# Patient Record
Sex: MALE | Race: WHITE | HISPANIC OR LATINO | Employment: FULL TIME | URBAN - METROPOLITAN AREA
[De-identification: names, ages, dates, MRNs, and addresses within clinical notes are randomized per-mention and may not be internally consistent; named-entity substitution may affect disease eponyms.]

---

## 2024-01-10 ENCOUNTER — HOSPITAL ENCOUNTER (EMERGENCY)
Facility: HOSPITAL | Age: 24
Discharge: HOME | End: 2024-01-10
Attending: INTERNAL MEDICINE

## 2024-01-10 ENCOUNTER — APPOINTMENT (OUTPATIENT)
Dept: CARDIOLOGY | Facility: HOSPITAL | Age: 24
End: 2024-01-10

## 2024-01-10 ENCOUNTER — APPOINTMENT (OUTPATIENT)
Dept: RADIOLOGY | Facility: HOSPITAL | Age: 24
End: 2024-01-10

## 2024-01-10 VITALS
WEIGHT: 180 LBS | TEMPERATURE: 98.2 F | HEART RATE: 74 BPM | BODY MASS INDEX: 28.93 KG/M2 | SYSTOLIC BLOOD PRESSURE: 135 MMHG | DIASTOLIC BLOOD PRESSURE: 79 MMHG | OXYGEN SATURATION: 97 % | HEIGHT: 66 IN | RESPIRATION RATE: 16 BRPM

## 2024-01-10 DIAGNOSIS — R55 SYNCOPE, UNSPECIFIED SYNCOPE TYPE: Primary | ICD-10-CM

## 2024-01-10 LAB
ALBUMIN SERPL BCP-MCNC: 4.6 G/DL (ref 3.4–5)
ALP SERPL-CCNC: 77 U/L (ref 33–120)
ALT SERPL W P-5'-P-CCNC: 29 U/L (ref 10–52)
ANION GAP SERPL CALC-SCNC: 13 MMOL/L (ref 10–20)
APPEARANCE UR: ABNORMAL
APTT PPP: 34 SECONDS (ref 27–38)
AST SERPL W P-5'-P-CCNC: 23 U/L (ref 9–39)
BASOPHILS # BLD AUTO: 0.02 X10*3/UL (ref 0–0.1)
BASOPHILS NFR BLD AUTO: 0.2 %
BILIRUB SERPL-MCNC: 0.4 MG/DL (ref 0–1.2)
BILIRUB UR STRIP.AUTO-MCNC: NEGATIVE MG/DL
BUN SERPL-MCNC: 13 MG/DL (ref 6–23)
CALCIUM SERPL-MCNC: 9.9 MG/DL (ref 8.6–10.3)
CARDIAC TROPONIN I PNL SERPL HS: <3 NG/L (ref 0–20)
CHLORIDE SERPL-SCNC: 102 MMOL/L (ref 98–107)
CO2 SERPL-SCNC: 25 MMOL/L (ref 21–32)
COLOR UR: YELLOW
CREAT SERPL-MCNC: 0.96 MG/DL (ref 0.5–1.3)
D DIMER PPP FEU-MCNC: 317 NG/ML FEU
EGFRCR SERPLBLD CKD-EPI 2021: >90 ML/MIN/1.73M*2
EOSINOPHIL # BLD AUTO: 0.02 X10*3/UL (ref 0–0.7)
EOSINOPHIL NFR BLD AUTO: 0.2 %
ERYTHROCYTE [DISTWIDTH] IN BLOOD BY AUTOMATED COUNT: 12.3 % (ref 11.5–14.5)
GLUCOSE BLD MANUAL STRIP-MCNC: 100 MG/DL (ref 74–99)
GLUCOSE SERPL-MCNC: 94 MG/DL (ref 74–99)
GLUCOSE UR STRIP.AUTO-MCNC: NEGATIVE MG/DL
HCT VFR BLD AUTO: 48.5 % (ref 41–52)
HGB BLD-MCNC: 15.7 G/DL (ref 13.5–17.5)
IMM GRANULOCYTES # BLD AUTO: 0.05 X10*3/UL (ref 0–0.7)
IMM GRANULOCYTES NFR BLD AUTO: 0.5 % (ref 0–0.9)
INR PPP: 1.3 (ref 0.9–1.1)
KETONES UR STRIP.AUTO-MCNC: NEGATIVE MG/DL
LEUKOCYTE ESTERASE UR QL STRIP.AUTO: NEGATIVE
LYMPHOCYTES # BLD AUTO: 1.1 X10*3/UL (ref 1.2–4.8)
LYMPHOCYTES NFR BLD AUTO: 11 %
MCH RBC QN AUTO: 29.9 PG (ref 26–34)
MCHC RBC AUTO-ENTMCNC: 32.4 G/DL (ref 32–36)
MCV RBC AUTO: 92 FL (ref 80–100)
MONOCYTES # BLD AUTO: 0.6 X10*3/UL (ref 0.1–1)
MONOCYTES NFR BLD AUTO: 6 %
NEUTROPHILS # BLD AUTO: 8.25 X10*3/UL (ref 1.2–7.7)
NEUTROPHILS NFR BLD AUTO: 82.1 %
NITRITE UR QL STRIP.AUTO: NEGATIVE
NRBC BLD-RTO: 0 /100 WBCS (ref 0–0)
PH UR STRIP.AUTO: 8 [PH]
PLATELET # BLD AUTO: 246 X10*3/UL (ref 150–450)
POTASSIUM SERPL-SCNC: 3.9 MMOL/L (ref 3.5–5.3)
PROT SERPL-MCNC: 7.8 G/DL (ref 6.4–8.2)
PROT UR STRIP.AUTO-MCNC: NEGATIVE MG/DL
PROTHROMBIN TIME: 14.1 SECONDS (ref 9.8–12.8)
RBC # BLD AUTO: 5.25 X10*6/UL (ref 4.5–5.9)
RBC # UR STRIP.AUTO: NEGATIVE /UL
SODIUM SERPL-SCNC: 136 MMOL/L (ref 136–145)
SP GR UR STRIP.AUTO: 1.01
UROBILINOGEN UR STRIP.AUTO-MCNC: <2 MG/DL
WBC # BLD AUTO: 10 X10*3/UL (ref 4.4–11.3)

## 2024-01-10 PROCEDURE — 87800 DETECT AGNT MULT DNA DIREC: CPT | Mod: PARLAB | Performed by: PHYSICIAN ASSISTANT

## 2024-01-10 PROCEDURE — 71045 X-RAY EXAM CHEST 1 VIEW: CPT

## 2024-01-10 PROCEDURE — 93005 ELECTROCARDIOGRAM TRACING: CPT

## 2024-01-10 PROCEDURE — 85730 THROMBOPLASTIN TIME PARTIAL: CPT | Performed by: PHYSICIAN ASSISTANT

## 2024-01-10 PROCEDURE — 82947 ASSAY GLUCOSE BLOOD QUANT: CPT

## 2024-01-10 PROCEDURE — 84484 ASSAY OF TROPONIN QUANT: CPT | Performed by: PHYSICIAN ASSISTANT

## 2024-01-10 PROCEDURE — 71045 X-RAY EXAM CHEST 1 VIEW: CPT | Performed by: RADIOLOGY

## 2024-01-10 PROCEDURE — 85610 PROTHROMBIN TIME: CPT | Performed by: PHYSICIAN ASSISTANT

## 2024-01-10 PROCEDURE — 85025 COMPLETE CBC W/AUTO DIFF WBC: CPT | Performed by: PHYSICIAN ASSISTANT

## 2024-01-10 PROCEDURE — 99283 EMERGENCY DEPT VISIT LOW MDM: CPT

## 2024-01-10 PROCEDURE — 84075 ASSAY ALKALINE PHOSPHATASE: CPT | Performed by: PHYSICIAN ASSISTANT

## 2024-01-10 PROCEDURE — 99284 EMERGENCY DEPT VISIT MOD MDM: CPT | Performed by: INTERNAL MEDICINE

## 2024-01-10 PROCEDURE — 85379 FIBRIN DEGRADATION QUANT: CPT | Performed by: PHYSICIAN ASSISTANT

## 2024-01-10 PROCEDURE — 81003 URINALYSIS AUTO W/O SCOPE: CPT | Performed by: PHYSICIAN ASSISTANT

## 2024-01-10 PROCEDURE — 36415 COLL VENOUS BLD VENIPUNCTURE: CPT | Performed by: PHYSICIAN ASSISTANT

## 2024-01-10 ASSESSMENT — COLUMBIA-SUICIDE SEVERITY RATING SCALE - C-SSRS
2. HAVE YOU ACTUALLY HAD ANY THOUGHTS OF KILLING YOURSELF?: NO
6. HAVE YOU EVER DONE ANYTHING, STARTED TO DO ANYTHING, OR PREPARED TO DO ANYTHING TO END YOUR LIFE?: NO
1. IN THE PAST MONTH, HAVE YOU WISHED YOU WERE DEAD OR WISHED YOU COULD GO TO SLEEP AND NOT WAKE UP?: NO

## 2024-01-10 ASSESSMENT — LIFESTYLE VARIABLES
REASON UNABLE TO ASSESS: YES
EVER FELT BAD OR GUILTY ABOUT YOUR DRINKING: NO
HAVE YOU EVER FELT YOU SHOULD CUT DOWN ON YOUR DRINKING: NO
HAVE PEOPLE ANNOYED YOU BY CRITICIZING YOUR DRINKING: NO
EVER HAD A DRINK FIRST THING IN THE MORNING TO STEADY YOUR NERVES TO GET RID OF A HANGOVER: NO

## 2024-01-10 ASSESSMENT — PAIN SCALES - GENERAL
PAINLEVEL_OUTOF10: 0 - NO PAIN

## 2024-01-10 ASSESSMENT — PAIN - FUNCTIONAL ASSESSMENT
PAIN_FUNCTIONAL_ASSESSMENT: 0-10
PAIN_FUNCTIONAL_ASSESSMENT: 0-10

## 2024-01-10 NOTE — ED PROVIDER NOTES
EMERGENCY MEDICINE EVALUATION NOTE    History of Present Illness     Chief Complaint:   Chief Complaint   Patient presents with    Medical Clearance     Patient arrives requiring medical clearance for a new job as a . No complaints. Denies any past medical or surgical history       HPI: Ruben Dixon is a 23 y.o. male presents with a chief complaint of syncopal episode.  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  According to the notes provided by the patient he was out for approximately 60 seconds.  Patient denies any significant past medical history other than vasovagal syncope.  He reports that when he was younger he would have episodes like this when he would go to the doctor.  He states that he had an echo of his heart when he was younger as well as EKGs which did not show any significant abnormalities.  He reports that he was sent here from his [employer] physical to be medically cleared.  Patient reports at this time he has no significant symptoms or complaints.  Patient has no chest pains or shortness of breath.  Patient reports he has no history of hypoglycemia or diabetes.  Patient denies any lightheaded or dizziness currently but states that is the preceding symptoms he has when he feels like he is going to pass out.    Previous History   History reviewed. No pertinent past medical history.  History reviewed. No pertinent surgical history.     No family history on file.  No Known Allergies  No current outpatient medications    Physical Exam     Appearance: Alert, oriented , cooperative,  in no acute distress.      Skin: Intact,  dry skin, no lesions, rash, petechiae or purpura.      Eyes: PERRLA, EOMs intact,  Conjunctiva pink      ENT: Hearing grossly intact. Pharynx clear     Neck: Supple. Trachea at midline.      Pulmonary: Clear bilaterally. No rales, rhonchi or wheezing. No accessory muscle use or  stridor.     Cardiac: Normal rate and rhythm without murmur     Abdomen: Soft, nontender, active bowel sounds.     Musculoskeletal: Full range of motion.      Neurological:Cranial nerves II through XII are grossly intact, normal sensation, no weakness, no focal findings identified.     Results     Labs Reviewed   CBC WITH AUTO DIFFERENTIAL - Abnormal       Result Value    WBC 10.0      nRBC 0.0      RBC 5.25      Hemoglobin 15.7      Hematocrit 48.5      MCV 92      MCH 29.9      MCHC 32.4      RDW 12.3      Platelets 246      Neutrophils % 82.1      Immature Granulocytes %, Automated 0.5      Lymphocytes % 11.0      Monocytes % 6.0      Eosinophils % 0.2      Basophils % 0.2      Neutrophils Absolute 8.25 (*)     Immature Granulocytes Absolute, Automated 0.05      Lymphocytes Absolute 1.10 (*)     Monocytes Absolute 0.60      Eosinophils Absolute 0.02      Basophils Absolute 0.02     PROTIME-INR - Abnormal    Protime 14.1 (*)     INR 1.3 (*)    URINALYSIS WITH REFLEX CULTURE AND MICROSCOPIC - Abnormal    Color, Urine Yellow      Appearance, Urine Hazy (*)     Specific Gravity, Urine 1.014      pH, Urine 8.0      Protein, Urine NEGATIVE      Glucose, Urine NEGATIVE      Blood, Urine NEGATIVE      Ketones, Urine NEGATIVE      Bilirubin, Urine NEGATIVE      Urobilinogen, Urine <2.0      Nitrite, Urine NEGATIVE      Leukocyte Esterase, Urine NEGATIVE     POCT GLUCOSE - Abnormal    POCT Glucose 100 (*)    COMPREHENSIVE METABOLIC PANEL - Normal    Glucose 94      Sodium 136      Potassium 3.9      Chloride 102      Bicarbonate 25      Anion Gap 13      Urea Nitrogen 13      Creatinine 0.96      eGFR >90      Calcium 9.9      Albumin 4.6      Alkaline Phosphatase 77      Total Protein 7.8      AST 23      Bilirubin, Total 0.4      ALT 29     APTT - Normal    aPTT 34      Narrative:     The APTT is no longer used for monitoring Unfractionated Heparin Therapy. For monitoring Heparin Therapy, use the Heparin Assay.    TROPONIN I, HIGH SENSITIVITY - Normal    Troponin I, High Sensitivity <3      Narrative:     Less than 99th percentile of normal range cutoff-  Female and children under 18 years old <14 ng/L; Male <21 ng/L: Negative  Repeat testing should be performed if clinically indicated.     Female and children under 18 years old 14-50 ng/L; Male 21-50 ng/L:  Consistent with possible cardiac damage and possible increased clinical   risk. Serial measurements may help to assess extent of myocardial damage.     >50 ng/L: Consistent with cardiac damage, increased clinical risk and  myocardial infarction. Serial measurements may help assess extent of   myocardial damage.      NOTE: Children less than 1 year old may have higher baseline troponin   levels and results should be interpreted in conjunction with the overall   clinical context.     NOTE: Troponin I testing is performed using a different   testing methodology at [facility] than at other   [facility]. Direct result comparisons should only   be made within the same method.   D-DIMER, VTE EXCLUSION - Normal    D-Dimer, Quantitative VTE Exclusion 317      Narrative:     The VTE Exclusion D-Dimer assay is reported in ng/mL Fibrinogen Equivalent Units (FEU).    Per 's instructions for use, a value of less than 500 ng/mL (FEU) may help to exclude DVT or PE in outpatients when the assay is used with a clinical pretest probability assessment.(AE must utilize and document eCalc 'Wells Score Deep Vein Thrombosis Risk' for DVT exclusion only. Emergency Department should utilize  Guidelines for Emergency Department Use of the VTE Exclusion D-Dimer and Clinical Pretest probability assessment model for DVT or PE exclusion.)   URINALYSIS WITH REFLEX CULTURE AND MICROSCOPIC    Narrative:     The following orders were created for panel order Urinalysis with Reflex Culture and Microscopic.  Procedure                               Abnormality          "Status                     ---------                               -----------         ------                     Urinalysis with Reflex C...[[id number]]  Abnormal            Final result               Extra Urine Gray Tube[[id number]]                            In process                   Please view results for these tests on the individual orders.   C. TRACHOMATIS + N. GONORRHOEAE, AMPLIFIED   EXTRA URINE GRAY TUBE     XR chest 1 view   Final Result   No acute cardiopulmonary process.        MACRO:   None        Signed by: Celestina Saha 1/10/2024 4:38 PM   Dictation workstation:   [id number]            ED Course & Medical Decision Making   Medications - No data to display  Heart Rate:  [77]   Temp:  [36.8 °C (98.2 °F)]   Resp:  [16]   BP: (135)/(79)   Height:  [167.6 cm (5' 6\")]   Weight:  [81.6 kg (180 lb)]   SpO2:  [97 %]    Diagnoses as of 01/10/24 1755   Syncope, unspecified syncope type     16:15  Patient came in today for medical clearance.  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 I stated that we will be able to evaluate him to make sure that there is no blood clot or acute MI that occurred today causing his syncopal episode.    16:35  Patient had received his blood work and was resting on bed when he complained that he felt lightheaded and had another syncopal episode.  At this time patient was moved to a full hospital cot and was staffed with attending physician.  Patient did not have any seizure-like activity and did not have a postictal period after the episode so I do not think this is a seizure.    17:50  Patient had a workup today which did not show any abnormalities on his blood work.  Patient's troponin was negative, patient's D-dimer was negative.  Patient's EKG did not show any ischemic changes.  Patient's blood glucose was normal even " "after a syncopal episode.  Patient's chest x-ray did not show any significant abnormalities.  Patient was updated on the results.  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  I also informed him he likely should not be driving a regular motor vehicle just in case there is a single episode that happens behind the wheel, and he states \"I won't pass out again\".  He once again was regardless advised not to drive a motor vehicle until he is evaluated by another physician.  We informed him he should be evaluated by cardiology as an outpatient.  Patient is in agreement the plan of care of discharge with referral to cardiology.  I did discuss with the patient that it is likely vasovagal syncope, however we cannot rule out any adverse cardiac events despite him not having any evidence of an MI here today. Patient encouraged to return here immediately without any worsening symptoms.    Procedures   ECG 12 lead    Performed by: Miguel Schuler PA-C  Authorized by: Miguel Schuler PA-C    ECG interpreted by ED Physician in the absence of a cardiologist: yes    Previous ECG:     Previous ECG:  Unavailable  Rate:     ECG rate:  64    ECG rate assessment: normal    Rhythm:     Rhythm: sinus rhythm      Rhythm comment:  With arrythmia  ST segments:     ST segments:  Normal  T waves:     T waves: normal    Comments:      No STEMI      Diagnosis     1. Syncope, unspecified syncope type        Disposition   Discharged    ED Prescriptions    None         Disclaimer: This note was dictated by speech recognition. Minor errors in transcription may be present. Please call if questions.       Miguel Schuler PA-C  01/10/24 1010    "

## 2024-01-10 NOTE — ED TRIAGE NOTES
Patient arrives requiring medical clearance for a new job. No complaints on arrival. Denies any past medical or surgical history

## 2024-01-11 LAB
ATRIAL RATE: 64 BPM
C TRACH RRNA SPEC QL NAA+PROBE: NEGATIVE
HOLD SPECIMEN: NORMAL
N GONORRHOEA DNA SPEC QL PROBE+SIG AMP: NEGATIVE
P AXIS: 48 DEGREES
P OFFSET: 194 MS
P ONSET: 147 MS
PR INTERVAL: 148 MS
Q ONSET: 221 MS
QRS COUNT: 10 BEATS
QRS DURATION: 82 MS
QT INTERVAL: 404 MS
QTC CALCULATION(BAZETT): 416 MS
QTC FREDERICIA: 412 MS
R AXIS: 80 DEGREES
T AXIS: 58 DEGREES
T OFFSET: 423 MS
VENTRICULAR RATE: 64 BPM

## 2024-01-17 ENCOUNTER — OFFICE VISIT (OUTPATIENT)
Dept: CARDIOLOGY | Facility: CLINIC | Age: 24
End: 2024-01-17

## 2024-01-17 VITALS
BODY MASS INDEX: 27.46 KG/M2 | HEIGHT: 68 IN | WEIGHT: 181.2 LBS | DIASTOLIC BLOOD PRESSURE: 60 MMHG | SYSTOLIC BLOOD PRESSURE: 120 MMHG

## 2024-01-17 DIAGNOSIS — R55 VASOVAGAL SYNCOPE: ICD-10-CM

## 2024-01-17 DIAGNOSIS — Z78.9 NEVER SMOKED CIGARETTES: ICD-10-CM

## 2024-01-17 DIAGNOSIS — E66.3 OVERWEIGHT WITH BODY MASS INDEX (BMI) OF 27 TO 27.9 IN ADULT: ICD-10-CM

## 2024-01-17 DIAGNOSIS — R42 DIZZINESS: ICD-10-CM

## 2024-01-17 PROCEDURE — 1036F TOBACCO NON-USER: CPT | Performed by: INTERNAL MEDICINE

## 2024-01-17 PROCEDURE — 99204 OFFICE O/P NEW MOD 45 MIN: CPT | Performed by: INTERNAL MEDICINE

## 2024-01-17 PROCEDURE — 3008F BODY MASS INDEX DOCD: CPT | Performed by: INTERNAL MEDICINE

## 2024-01-17 NOTE — PATIENT INSTRUCTIONS
You will be scheduled for a treadmill stress test, echocardiogram, 24 hour Holter monitor  Patient to follow up after testing.    Continue same medications/treatment.  Patient educated on proper medication use.  Please bring all medicines, vitamins and herbal supplements with you when you come to the office.    I, Montse Acosta LPN, am scribing for and in the presence of Dr. Nathan Stone MD, FACC

## 2024-01-17 NOTE — PROGRESS NOTES
CARDIOLOGY OFFICE VISIT      CHIEF COMPLAINT  Clearance for work    HISTORY OF PRESENT ILLNESS  The patient is being seen today for cardiac evaluation because of recent syncopal episode when he is having his blood pressure taken for his physical to be a .  He states he was out for a very short period of time.  He has a warning that he is going to pass out.  Has had this problem ever since he was young.  He has had several vasovagal episodes whenever he was going to have blood drawn and things like that.  He has not had any syncope at any other times.  He denies any past history of definite organic heart disease.  He denies chest discomfort.  He denies dyspnea exertion.  He denies palpitations.  He does not smoke cigarettes.  He does not have any past history of hypertension, hyperlipidemia, or diabetes mellitus.  There is no immediate family history of cardiac pathology.  He did have CBC, chemistry profile, and chest x-ray when he was in the emergency room after the syncopal episode.  The studies were unremarkable.  His EKG in the emergency room demonstrates normal sinus rhythm within normal limits.    Impression:  Syncope, vasovagal  Overweight    Plan:  24-hour Holter monitor  GXT  Echocardiogram      I will see the patient back in the office after the above studies.  If they are satisfactory I told him I will clear him for to be a     Please excuse any errors in grammar or translation related to this dictation.  Voice recognition software was utilized to prepare this document.    Past Medical History  No past medical history on file.    Social History  Social History     Tobacco Use    Smoking status: Never    Smokeless tobacco: Never   Substance Use Topics    Alcohol use: Not on file     Comment: occasionally    Drug use: Not Currently       Family History   No family history on file.     Allergies:  No Known Allergies     Outpatient Medications:  No current outpatient medications        REVIEW OF SYSTEMS  Review of Systems   All other systems reviewed and are negative.        VITALS  Vitals:    01/17/24 1351   BP: 120/60       PHYSICAL EXAM  Constitutional:       Appearance: Healthy appearance. Not in distress.   Eyes:      Conjunctiva/sclera: Conjunctivae normal.      Pupils: Pupils are equal, round, and reactive to light.   Neck:      Vascular: No JVR. JVD normal.   Pulmonary:      Effort: Pulmonary effort is normal.      Breath sounds: Normal breath sounds. No wheezing. No rhonchi. No rales.   Chest:      Chest wall: Not tender to palpatation.   Cardiovascular:      PMI at left midclavicular line. Normal rate. Regular rhythm. Normal S1. Normal S2.       Murmurs: There is no murmur.      No gallop.  No click. No rub.   Pulses:     Intact distal pulses.   Edema:     Peripheral edema absent.   Abdominal:      Tenderness: There is no abdominal tenderness.   Musculoskeletal: Normal range of motion.         General: No tenderness.      Cervical back: Normal range of motion. Skin:     General: Skin is warm and dry.   Neurological:      General: No focal deficit present.      Mental Status: Alert and oriented to person, place and time.           ASSESSMENT AND PLAN  Diagnoses and all orders for this visit:  Vasovagal syncope  Dizziness  Overweight with body mass index (BMI) of 27 to 27.9 in adult  Never smoked cigarettes  Other orders  -     Referral to Cardiology      [unfilled]

## 2024-01-18 ENCOUNTER — TELEPHONE (OUTPATIENT)
Dept: CARDIOLOGY | Facility: CLINIC | Age: 24
End: 2024-01-18

## 2024-01-19 NOTE — TELEPHONE ENCOUNTER
Pt left message asking for a return to work note.  Pt can be reached at [phone].    Routed to Shira BRADSHAW LPN

## 2024-01-23 NOTE — TELEPHONE ENCOUNTER
1/23  Call placed to patient and left message requesting return call.  Will need to know what type of work he is currently doing.  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

## 2024-02-15 ENCOUNTER — APPOINTMENT (OUTPATIENT)
Dept: CARDIOLOGY | Facility: CLINIC | Age: 24
End: 2024-02-15

## 2024-02-21 ENCOUNTER — TELEPHONE (OUTPATIENT)
Dept: CARDIOLOGY | Facility: CLINIC | Age: 24
End: 2024-02-21

## 2024-02-21 NOTE — TELEPHONE ENCOUNTER
Patient no showed for his monitor.  Called and left message for patient to call us back and reschedule.   Rest, push fluids, Tylenol or Ibuprofen as needed for fever or chills, saline nasal spray, or netti pot as needed for congestion, Mucinex dm as expectorant,   May use Afrin or equivalent long-acting nasal decongestant spray up to twice daily for 3 days  Alina Cassidy

## 2024-03-06 ENCOUNTER — APPOINTMENT (OUTPATIENT)
Dept: CARDIOLOGY | Facility: CLINIC | Age: 24
End: 2024-03-06

## 2024-04-01 ENCOUNTER — TELEPHONE (OUTPATIENT)
Dept: CARDIOLOGY | Facility: CLINIC | Age: 24
End: 2024-04-01

## 2024-04-01 NOTE — TELEPHONE ENCOUNTER
Kari from  Financial dept left message staing that pt does NOT have insurance.  Per Kari they will work with pt to get financial assistance.  However they need to know if the stress test is Medically necessary.  Kari can be reached at [phone]    Routed to Shira BRADSHAW LPN

## 2024-04-09 ENCOUNTER — HOSPITAL ENCOUNTER (OUTPATIENT)
Dept: CARDIOLOGY | Facility: HOSPITAL | Age: 24
Discharge: HOME | End: 2024-04-09

## 2024-04-09 DIAGNOSIS — R42 DIZZINESS: ICD-10-CM

## 2024-04-09 DIAGNOSIS — R55 VASOVAGAL SYNCOPE: ICD-10-CM

## 2024-04-09 LAB
AORTIC VALVE MEAN GRADIENT: 3 MMHG
AORTIC VALVE PEAK VELOCITY: 1.08 M/S
AV PEAK GRADIENT: 4.7 MMHG
AVA (PEAK VEL): 3.77 CM2
AVA (VTI): 3.66 CM2
EJECTION FRACTION APICAL 4 CHAMBER: 56
LEFT ATRIUM VOLUME AREA LENGTH INDEX BSA: 10.8 ML/M2
LEFT VENTRICLE INTERNAL DIMENSION DIASTOLE: 4.13 CM (ref 3.5–6)
LEFT VENTRICULAR OUTFLOW TRACT DIAMETER: 2.2 CM
LV EJECTION FRACTION BIPLANE: 58 %
MITRAL VALVE E/A RATIO: 1.09
MITRAL VALVE E/E' RATIO: 3.39
RIGHT VENTRICLE FREE WALL PEAK S': 15 CM/S
TRICUSPID ANNULAR PLANE SYSTOLIC EXCURSION: 2.5 CM

## 2024-04-09 PROCEDURE — 93225 XTRNL ECG REC<48 HRS REC: CPT

## 2024-04-09 PROCEDURE — 93017 CV STRESS TEST TRACING ONLY: CPT

## 2024-04-09 PROCEDURE — 93227 XTRNL ECG REC<48 HR R&I: CPT | Performed by: INTERNAL MEDICINE

## 2024-04-09 PROCEDURE — 93306 TTE W/DOPPLER COMPLETE: CPT | Performed by: INTERNAL MEDICINE

## 2024-04-09 PROCEDURE — 93016 CV STRESS TEST SUPVJ ONLY: CPT | Performed by: INTERNAL MEDICINE

## 2024-04-09 PROCEDURE — 93306 TTE W/DOPPLER COMPLETE: CPT

## 2024-04-09 PROCEDURE — 93018 CV STRESS TEST I&R ONLY: CPT | Performed by: INTERNAL MEDICINE

## 2024-04-12 ENCOUNTER — TELEPHONE (OUTPATIENT)
Dept: CARDIOLOGY | Facility: CLINIC | Age: 24
End: 2024-04-12

## 2024-04-12 NOTE — TELEPHONE ENCOUNTER
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

## 2024-04-17 ENCOUNTER — TELEPHONE (OUTPATIENT)
Dept: CARDIOLOGY | Facility: CLINIC | Age: 24
End: 2024-04-17

## 2024-04-25 ENCOUNTER — OFFICE VISIT (OUTPATIENT)
Dept: CARDIOLOGY | Facility: CLINIC | Age: 24
End: 2024-04-25

## 2024-04-25 VITALS
SYSTOLIC BLOOD PRESSURE: 130 MMHG | WEIGHT: 187.6 LBS | BODY MASS INDEX: 28.43 KG/M2 | DIASTOLIC BLOOD PRESSURE: 90 MMHG | HEIGHT: 68 IN | HEART RATE: 86 BPM

## 2024-04-25 DIAGNOSIS — R55 VASOVAGAL SYNCOPE: ICD-10-CM

## 2024-04-25 DIAGNOSIS — Z78.9 NEVER SMOKED CIGARETTES: ICD-10-CM

## 2024-04-25 PROCEDURE — 1036F TOBACCO NON-USER: CPT | Performed by: INTERNAL MEDICINE

## 2024-04-25 PROCEDURE — 3008F BODY MASS INDEX DOCD: CPT | Performed by: INTERNAL MEDICINE

## 2024-04-25 PROCEDURE — 99214 OFFICE O/P EST MOD 30 MIN: CPT | Performed by: INTERNAL MEDICINE

## 2024-04-25 NOTE — PATIENT INSTRUCTIONS
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  Follow up as needed  Continue same medications/treatment.  Patient educated on proper medication use.  Patient educated on risk factor modification.  Please bring any lab results from other providers / physicians to your next appointment.    Please bring all medicines, vitamins and herbal supplements with you when you come to the office.    Prescriptions will not be filled unless you are compliant with your follow up appointments or have a follow up  appointment scheduled as per instruction of your physician.  Refills should be requested at the time of  Your visit.    Montse MURRAY LPN, am scribing for and in the presence of Dr. Nathan Stone MD, FACC

## 2024-04-25 NOTE — PROGRESS NOTES
CARDIOLOGY OFFICE VISIT      CHIEF COMPLAINT      HISTORY OF PRESENT ILLNESS  The patient is being seen for follow-up after recent evaluation on 1/17/2024.  Please see that note for complete details.  The patient has not had any further episodes of syncope or near syncope.  He denies chest discomfort.  He denies dyspnea on exertion.  He did undergo GXT.  This is entirely normal.  He had echocardiogram performed which demonstrates no significant abnormalities.  His left ventricular systolic function was normal.  His Holter monitor demonstrates no significant cardiac arrhythmias.  I went over all these test with him.  I explained to him that I believe we just have vasovagal syncope episode.  He does not have any underlying organic heart disease.  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  I told him I will see him back in the office in 1 year for repeat cardiac evaluation as long as no problems in the meantime.  There is no need for any medications.      Impression:  Syncope, vasovagal  Overweight    The patient cardiac status is very stable.  He has no evidence of any significant organic heart disease.  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     Please excuse any errors in grammar or translation related to this dictation.  Voice recognition software was utilized to prepare this document.      Past Medical History  No past medical history on file.    Social History  Social History     Tobacco Use    Smoking status: Never    Smokeless tobacco: Never   Substance Use Topics    Alcohol use: Not on file     Comment: occasionally    Drug use: Not Currently       Family History   No family history on file.     Allergies:  No Known Allergies     Outpatient Medications:  No current outpatient medications       REVIEW OF SYSTEMS  Review of Systems   All other systems reviewed and are negative.        VITALS  Vitals:    04/25/24 1505   BP: 130/90   Pulse: 86       PHYSICAL EXAM  Constitutional:        Appearance: Healthy appearance. Not in distress.   Eyes:      Conjunctiva/sclera: Conjunctivae normal.      Pupils: Pupils are equal, round, and reactive to light.   Neck:      Vascular: No JVR. JVD normal.   Pulmonary:      Effort: Pulmonary effort is normal.      Breath sounds: Normal breath sounds. No wheezing. No rhonchi. No rales.   Chest:      Chest wall: Not tender to palpatation.   Cardiovascular:      PMI at left midclavicular line. Normal rate. Regular rhythm. Normal S1. Normal S2.       Murmurs: There is no murmur.      No gallop.  No click. No rub.   Pulses:     Intact distal pulses.   Edema:     Peripheral edema absent.   Abdominal:      Tenderness: There is no abdominal tenderness.   Musculoskeletal: Normal range of motion.         General: No tenderness.      Cervical back: Normal range of motion. Skin:     General: Skin is warm and dry.   Neurological:      General: No focal deficit present.      Mental Status: Alert and oriented to person, place and time.           ASSESSMENT AND PLAN  Diagnoses and all orders for this visit:  Vasovagal syncope  BMI 28.0-28.9,adult  Never smoked cigarettes      [unfilled]